# Patient Record
Sex: MALE | Race: WHITE | URBAN - METROPOLITAN AREA
[De-identification: names, ages, dates, MRNs, and addresses within clinical notes are randomized per-mention and may not be internally consistent; named-entity substitution may affect disease eponyms.]

---
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
## 2020-12-21 ENCOUNTER — HOSPITAL ENCOUNTER (OUTPATIENT)
Age: 20
Setting detail: SPECIMEN
Discharge: HOME | End: 2020-12-21
Payer: COMMERCIAL

## 2020-12-21 ENCOUNTER — OFFICE VISIT (OUTPATIENT)
Dept: FAMILY MEDICINE CLINIC | Age: 20
End: 2020-12-21
Payer: COMMERCIAL

## 2020-12-21 VITALS — OXYGEN SATURATION: 97 % | TEMPERATURE: 97.2 F | HEART RATE: 104 BPM

## 2020-12-21 PROCEDURE — 99202 OFFICE O/P NEW SF 15 MIN: CPT | Performed by: NURSE PRACTITIONER

## 2020-12-21 ASSESSMENT — ENCOUNTER SYMPTOMS
DIARRHEA: 0
NAUSEA: 0
SINUS PAIN: 0
ABDOMINAL PAIN: 0
BACK PAIN: 0
VOMITING: 0
SORE THROAT: 0
COUGH: 1
EYE PAIN: 0
SHORTNESS OF BREATH: 0

## 2020-12-21 NOTE — PROGRESS NOTES
[address] [facility]  [address]  [address] [facility] [address] [address]  Dept: [phone]  Dept Fax: [phone]    Collins Wang is a 21 y.o. male who presents to the urgent care today for his medicalconditions/complaints as noted below. Collins Wang is c/o of Covid Testing      HPI:         71-year-old male patient presents with complaint of multiple symptoms. Reports approximately 1 to 2 days he has had mild cough and loss of smell. Denies fevers or chills. Denies back fatigue. Denies nasal congestion. Denies chest pain or shortness of breath. Denies vomiting or diarrhea. Denies loss of taste smell. Treatments tried include [place] cold and flu. Reports positive COVID-19 exposure      Past Medical History:   Diagnosis Date    ADHD (attention deficit hyperactivity disorder)         Current Outpatient Medications   Medication Sig Dispense Refill    Dexmethylphenidate HCl ER 20 MG CP24 Take 1 capsule by mouth daily. 0     No current facility-administered medications for this visit. No Known Allergies    Subjective:      Review of Systems   Constitutional: Negative for chills and fatigue. HENT: Negative for congestion, ear pain, sinus pain and sore throat. Loss of smell   Eyes: Negative for pain and visual disturbance. Respiratory: Positive for cough. Negative for shortness of breath. Cardiovascular: Negative for chest pain and palpitations. Gastrointestinal: Negative for abdominal pain, diarrhea, nausea and vomiting. Genitourinary: Negative for penile pain and testicular pain. Musculoskeletal: Negative for back pain, joint swelling and neck pain. Skin: Negative for rash. Neurological: Negative for dizziness and light-headedness. Hematological: Does not bruise/bleed easily. All other systems reviewed and are negative. Objective:     Physical Exam  Vitals signs and nursing note reviewed.    Constitutional: General: He is not in acute distress. Appearance: Normal appearance. He is not toxic-appearing. HENT:      Right Ear: Tympanic membrane normal.      Left Ear: Tympanic membrane normal.      Nose: Nose normal.      Mouth/Throat:      Mouth: Mucous membranes are moist.      Pharynx: No posterior oropharyngeal erythema. Cardiovascular:      Rate and Rhythm: Normal rate. Pulmonary:      Effort: Pulmonary effort is normal. No respiratory distress. Breath sounds: Normal breath sounds. Skin:     General: Skin is warm and dry. Neurological:      General: No focal deficit present. Mental Status: He is alert and oriented to person, place, and time. Pulse 104   Temp 97.2 °F (36.2 °C)   SpO2 97%   Lab Review   No visits with results within 2 Month(s) from this visit. Latest known visit with results is:   No results found for any previous visit. Assessment:       Diagnosis Orders   1. Cough with exposure to COVID-19 virus  COVID-19 Ambulatory       Plan:      Return if symptoms worsen or fail to improve. No orders of the defined types were placed in this encounter. Recommend isolation pending covid results. Discussed treatment regimen to include rest, hydration, tylenol prn. Discussed deep breathing exercises. Discussed to monitor for progression of symptoms. Follow up as needed. Will contact patient for results  ER for acutely worsening symptoms. Patient given educational materials - see patient instructions. Discussed use, benefit, and side effects of prescribed medications. All patientquestions answered. Pt voiced understanding. This note was transcribed using dictation with Dragon services. Efforts were made to correct any errors but some words may be misinterpreted.      Electronically signed by PAT Jeffrey CNP on 12/21/2020at 3:28 PM

## 2020-12-21 NOTE — PATIENT INSTRUCTIONS
Advance Care Planning  People with COVID-19 may have no symptoms, mild symptoms, such as fever, cough, and shortness of breath or they may have more severe illness, developing severe and fatal pneumonia. As a result, Advance Care Planning with attention to naming a health care decision maker (someone you trust to make healthcare decisions for you if you could not speak for yourself) and sharing other health care preferences is important BEFORE a possible health crisis. Please contact your Primary Care Provider to discuss Advance Care Planning. Preventing the Spread of Coronavirus Disease 2019 in Homes and Residential Communities  For the most recent information go to SiConnect.fi    Prevention steps for People with confirmed or suspected COVID-19 (including persons under investigation) who do not need to be hospitalized  and   People with confirmed COVID-19 who were hospitalized and determined to be medically stable to go home    Your healthcare provider and public health staff will evaluate whether you can be cared for at home. If it is determined that you do not need to be hospitalized and can be isolated at home, you will be monitored by staff from your local or state health department. You should follow the prevention steps below until a healthcare provider or local or state health department says you can return to your normal activities. Stay home except to get medical care  People who are mildly ill with COVID-19 are able to isolate at home during their illness. You should restrict activities outside your home, except for getting medical care. Do not go to work, school, or public areas. Avoid using public transportation, ride-sharing, or taxis.   Separate yourself from other people and animals in your home People: As much as possible, you should stay in a specific room and away from other people in your home. Also, you should use a separate bathroom, if available. Animals: You should restrict contact with pets and other animals while you are sick with COVID-19, just like you would around other people. Although there have not been reports of pets or other animals becoming sick with COVID-19, it is still recommended that people sick with COVID-19 limit contact with animals until more information is known about the virus. When possible, have another member of your household care for your animals while you are sick. If you are sick with COVID-19, avoid contact with your pet, including petting, snuggling, being kissed or licked, and sharing food. If you must care for your pet or be around animals while you are sick, wash your hands before and after you interact with pets and wear a facemask. Call ahead before visiting your doctor  If you have a medical appointment, call the healthcare provider and tell them that you have or may have COVID-19. This will help the healthcare providers office take steps to keep other people from getting infected or exposed. Wear a facemask  You should wear a facemask when you are around other people (e.g., sharing a room or vehicle) or pets and before you enter a healthcare providers office. If you are not able to wear a facemask (for example, because it causes trouble breathing), then people who live with you should not stay in the same room with you, or they should wear a facemask if they enter your room. Cover your coughs and sneezes  Cover your mouth and nose with a tissue when you cough or sneeze. Throw used tissues in a lined trash can. Immediately wash your hands with soap and water for at least 20 seconds or, if soap and water are not available, clean your hands with an alcohol-based hand  that contains at least 60% alcohol.   Clean your hands often Seek prompt medical attention if your illness is worsening (e.g., difficulty breathing). Before seeking care, call your healthcare provider and tell them that you have, or are being evaluated for, COVID-19. Put on a facemask before you enter the facility. These steps will help the healthcare providers office to keep other people in the office or waiting room from getting infected or exposed. Ask your healthcare provider to call the local or state health department. Persons who are placed under active monitoring or facilitated self-monitoring should follow instructions provided by their local health department or occupational health professionals, as appropriate. When working with your local health department check their available hours. If you have a medical emergency and need to call 911, notify the dispatch personnel that you have, or are being evaluated for COVID-19. If possible, put on a facemask before emergency medical services arrive. Discontinuing home isolation  Patients with confirmed COVID-19 should remain under home isolation precautions until the risk of secondary transmission to others is thought to be low. The decision to discontinue home isolation precautions should be made on a case-by-case basis, in consultation with healthcare providers and state and local health departments.

## 2020-12-23 LAB — SARS-COV-2, NAA: NOT DETECTED

## 2021-07-13 ENCOUNTER — OFFICE VISIT (OUTPATIENT)
Dept: PRIMARY CARE CLINIC | Age: 21
End: 2021-07-13
Payer: COMMERCIAL

## 2021-07-13 ENCOUNTER — HOSPITAL ENCOUNTER (OUTPATIENT)
Age: 21
Setting detail: SPECIMEN
Discharge: HOME | End: 2021-07-13
Payer: COMMERCIAL

## 2021-07-13 VITALS
DIASTOLIC BLOOD PRESSURE: 67 MMHG | WEIGHT: 235 LBS | HEIGHT: 73 IN | HEART RATE: 54 BPM | BODY MASS INDEX: 31.14 KG/M2 | OXYGEN SATURATION: 98 % | SYSTOLIC BLOOD PRESSURE: 105 MMHG | TEMPERATURE: 97.6 F

## 2021-07-13 DIAGNOSIS — J01.90 ACUTE NON-RECURRENT SINUSITIS, UNSPECIFIED LOCATION: Primary | ICD-10-CM

## 2021-07-13 DIAGNOSIS — Z20.822 SUSPECTED COVID-19 VIRUS INFECTION: ICD-10-CM

## 2021-07-13 PROCEDURE — 99214 OFFICE O/P EST MOD 30 MIN: CPT | Performed by: FAMILY MEDICINE

## 2021-07-13 RX ORDER — AMOXICILLIN AND CLAVULANATE POTASSIUM 875; 125 MG/1; MG/1
1 TABLET, FILM COATED ORAL 2 TIMES DAILY
Qty: 14 TABLET | Refills: 0 | Status: SHIPPED | OUTPATIENT
Start: 2021-07-13 | End: 2021-07-20

## 2021-07-13 ASSESSMENT — PATIENT HEALTH QUESTIONNAIRE - PHQ9
2. FEELING DOWN, DEPRESSED OR HOPELESS: 0
SUM OF ALL RESPONSES TO PHQ QUESTIONS 1-9: 0
SUM OF ALL RESPONSES TO PHQ QUESTIONS 1-9: 0
SUM OF ALL RESPONSES TO PHQ9 QUESTIONS 1 & 2: 0
1. LITTLE INTEREST OR PLEASURE IN DOING THINGS: 0
SUM OF ALL RESPONSES TO PHQ QUESTIONS 1-9: 0

## 2021-07-13 ASSESSMENT — ENCOUNTER SYMPTOMS
SORE THROAT: 0
ABDOMINAL PAIN: 0
COUGH: 1
DIARRHEA: 0
SINUS COMPLAINT: 1
SINUS PRESSURE: 1
NAUSEA: 0
WHEEZING: 0
VOMITING: 0
SHORTNESS OF BREATH: 0
RHINORRHEA: 1

## 2021-07-13 NOTE — PATIENT INSTRUCTIONS
Patient Education        Sinusitis: Care Instructions  Your Care Instructions     Sinusitis is an infection of the lining of the sinus cavities in your head. Sinusitis often follows a cold. It causes pain and pressure in your head and face. In most cases, sinusitis gets better on its own in 1 to 2 weeks. But some mild symptoms may last for several weeks. Sometimes antibiotics are needed. Follow-up care is a key part of your treatment and safety. Be sure to make and go to all appointments, and call your doctor if you are having problems. It's also a good idea to know your test results and keep a list of the medicines you take. How can you care for yourself at home? · Take an over-the-counter pain medicine, such as acetaminophen (Tylenol), ibuprofen (Advil, Motrin), or naproxen (Aleve). Read and follow all instructions on the label. · If the doctor prescribed antibiotics, take them as directed. Do not stop taking them just because you feel better. You need to take the full course of antibiotics. · Be careful when taking over-the-counter cold or flu medicines and Tylenol at the same time. Many of these medicines have acetaminophen, which is Tylenol. Read the labels to make sure that you are not taking more than the recommended dose. Too much acetaminophen (Tylenol) can be harmful. · Breathe warm, moist air from a steamy shower, a hot bath, or a sink filled with hot water. Avoid cold, dry air. Using a humidifier in your home may help. Follow the directions for cleaning the machine. · Use saline (saltwater) nasal washes. This can help keep your nasal passages open and wash out mucus and bacteria. You can buy saline nose drops at a grocery store or drugstore. Or you can make your own at home by adding 1 teaspoon of salt and 1 teaspoon of baking soda to 2 cups of distilled water. If you make your own, fill a bulb syringe with the solution, insert the tip into your nostril, and squeeze gently.  Shayna amato nose.  · Put a hot, wet towel or a warm gel pack on your face 3 or 4 times a day for 5 to 10 minutes each time. · Try a decongestant nasal spray like oxymetazoline (Afrin). Do not use it for more than 3 days in a row. Using it for more than 3 days can make your congestion worse. When should you call for help? Call your doctor now or seek immediate medical care if:    · You have new or worse swelling or redness in your face or around your eyes.     · You have a new or higher fever. Watch closely for changes in your health, and be sure to contact your doctor if:    · You have new or worse facial pain.     · The mucus from your nose becomes thicker (like pus) or has new blood in it.     · You are not getting better as expected. Where can you learn more? Go to https://[employer].Epy.io. org and sign in to your Clikthrough account. Enter R154 in the DVDPlay box to learn more about \"Sinusitis: Care Instructions. \"     If you do not have an account, please click on the \"Sign Up Now\" link. Current as of: December 2, 2020               Content Version: 12.9  © 8201-9263 Healthwise, [facility]. Care instructions adapted under license by [facility] ([facility]). If you have questions about a medical condition or this instruction, always ask your healthcare professional. [address]  any warranty or liability for your use of this information.        Take antibiotic as prescribed for suspected sinus infection  If symptoms worsen or do not improve please follow-up with PCP or return to clinic

## 2021-07-13 NOTE — PROGRESS NOTES
[facility] [address] [facility] [place] [facility]  [address] [place] [address] [address] [address]  Dept: [phone]  Dept Fax: [phone]    Shantelle Mccoy is a 24 y.o. male who presents today for his medical conditions/complaintsas noted below. Shantelle Mccoy is c/o of Sinusitis (pt has been having head pressure and congestion with cough and some runny nose X 5 days )        HPI:     Sinus Problem  This is a new problem. The current episode started in the past 7 days (5 days). The problem is unchanged. There has been no fever. The pain is mild. Associated symptoms include congestion, coughing and sinus pressure. Pertinent negatives include no chills, ear pain, headaches, shortness of breath or sore throat. (Rhinorrhea) Treatments tried: tan ronak, OTC cold med. The treatment provided no relief. No known sick contacts  No significant past medical history  Past Medical History:   Diagnosis Date    ADHD (attention deficit hyperactivity disorder)     Past medical history reviewed and pertinent positives/negatives in the HPI    History reviewed. No pertinent surgical history. History reviewed. No pertinent family history. Social History     Tobacco Use    Smoking status: Never Smoker    Smokeless tobacco: Never Used   Substance Use Topics    Alcohol use: Not on file      Current Outpatient Medications   Medication Sig Dispense Refill    amoxicillin-clavulanate (AUGMENTIN) 875-125 MG per tablet Take 1 tablet by mouth 2 times daily for 7 days 14 tablet 0    Dexmethylphenidate HCl ER 20 MG CP24 Take 1 capsule by mouth daily. 0     No current facility-administered medications for this visit.      No Known Allergies    Health Maintenance   Topic Date Due    Hepatitis C screen  Never done    Varicella vaccine (1 of 2 - 2-dose childhood series) Never done    HPV vaccine (1 - Male 2-dose series) Never done    COVID-19 Vaccine (1) Never done    HIV screen  Never done    DTaP/Tdap/Td vaccine (1 - Cuff Size: Large Adult)   Pulse 54   Temp 97.6 °F (36.4 °C) (Tympanic)   Ht 6' 1\" (1.854 m)   Wt 235 lb (106.6 kg)   SpO2 98%   BMI 31.00 kg/m²     Assessment:       Diagnosis Orders   1. Acute non-recurrent sinusitis, unspecified location     2. Suspected COVID-19 virus infection  COVID-19       Plan:    Take antibiotic as prescribed for suspected sinus infection  If symptoms worsen or do not improve please follow-up with PCP or return to clinic    Orders Placed This Encounter   Procedures    COVID-19     Standing Status:   Future     Standing Expiration Date:   7/13/2022     Scheduling Instructions:      1) Due to current limited availability of the COVID-19 test, tests will be prioritized based on responses to questions above. Testing may be delayed due to volume. 2) Print and instruct patient to adhere to CDC home isolation program. (Link Above)              3) Set up or refer patient for a monitoring program.              4) Have patient sign up for and leverage MyChart (if not previously done). Order Specific Question:   Is this test for diagnosis or screening? Answer:   Diagnosis of ill patient     Order Specific Question:   Symptomatic for COVID-19 as defined by CDC? Answer:   Yes     Order Specific Question:   Date of Symptom Onset     Answer:   7/8/2021     Order Specific Question:   Hospitalized for COVID-19? Answer:   No     Order Specific Question:   Admitted to ICU for COVID-19? Answer:   No     Order Specific Question:   Employed in healthcare setting? Answer:   Unknown     Order Specific Question:   Resident in a congregate (group) care setting? Answer:   Unknown     Order Specific Question:   Pregnant: Answer:   No     Order Specific Question:   Previously tested for COVID-19?      Answer:   Yes     Orders Placed This Encounter   Medications    amoxicillin-clavulanate (AUGMENTIN) 875-125 MG per tablet     Sig: Take 1 tablet by mouth 2 times daily for 7 days     Dispense:  14 tablet     Refill:  0      Patient given educational materials - see patient instructions. Discussed use, benefit, and side effects of prescribed medications. All patient questions answered. Pt voiced understanding. Patient agreed with treatment plan. Follow up as directed.      Electronicallysigned by Luis Manuel Burk MD on 7/13/2021 at 10:37 AM

## 2021-07-14 LAB
SARS-COV-2: NORMAL
SARS-COV-2: NOT DETECTED
SOURCE: NORMAL

## 2022-03-18 ENCOUNTER — OFFICE VISIT (OUTPATIENT)
Dept: PRIMARY CARE CLINIC | Age: 22
End: 2022-03-18
Payer: COMMERCIAL

## 2022-03-18 VITALS
OXYGEN SATURATION: 98 % | TEMPERATURE: 97.5 F | HEART RATE: 59 BPM | SYSTOLIC BLOOD PRESSURE: 115 MMHG | DIASTOLIC BLOOD PRESSURE: 70 MMHG

## 2022-03-18 DIAGNOSIS — M54.50 ACUTE LEFT-SIDED LOW BACK PAIN WITHOUT SCIATICA: Primary | ICD-10-CM

## 2022-03-18 PROCEDURE — 99213 OFFICE O/P EST LOW 20 MIN: CPT | Performed by: FAMILY MEDICINE

## 2022-03-18 RX ORDER — PREDNISONE 20 MG/1
20 TABLET ORAL 2 TIMES DAILY
Qty: 10 TABLET | Refills: 0 | Status: SHIPPED | OUTPATIENT
Start: 2022-03-18 | End: 2022-03-23

## 2022-03-18 RX ORDER — CYCLOBENZAPRINE HCL 10 MG
10 TABLET ORAL 3 TIMES DAILY PRN
Qty: 15 TABLET | Refills: 0 | Status: SHIPPED | OUTPATIENT
Start: 2022-03-18 | End: 2022-03-28

## 2022-03-18 RX ORDER — ESCITALOPRAM OXALATE 20 MG/1
TABLET ORAL
COMMUNITY
Start: 2022-03-06

## 2022-03-18 ASSESSMENT — ENCOUNTER SYMPTOMS
BACK PAIN: 1
BOWEL INCONTINENCE: 0

## 2022-03-18 NOTE — PATIENT INSTRUCTIONS
Patient Education        Back Pain: Care Instructions  Your Care Instructions     Back pain has many possible causes. It is often related to problems with muscles and ligaments of the back. It may also be related to problems with the nerves, discs, or bones of the back. Moving, lifting, standing, sitting, or sleeping in an awkward way can strain the back. Sometimes you don't notice the injury until later. Arthritis is another common cause of back pain. Although it may hurt a lot, back pain usually improves on its own within several weeks. Most people recover in 12 weeks or less. Using good home treatment and being careful not to stress your back can help you feel better sooner. Follow-up care is a key part of your treatment and safety. Be sure to make and go to all appointments, and call your doctor if you are having problems. It's also a good idea to know your test results and keep a list of the medicines you take. How can you care for yourself at home? · Sit or lie in positions that are most comfortable and reduce your pain. Try one of these positions when you lie down:  ? Lie on your back with your knees bent and supported by large pillows. ? Lie on the floor with your legs on the seat of a sofa or chair. ? Lie on your side with your knees and hips bent and a pillow between your legs. ? Lie on your stomach if it does not make pain worse. · Do not sit up in bed, and avoid soft couches and twisted positions. Bed rest can help relieve pain at first, but it delays healing. Avoid bed rest after the first day of back pain. · Change positions every 30 minutes. If you must sit for long periods of time, take breaks from sitting. Get up and walk around, or lie in a comfortable position. · Try using a heating pad on a low or medium setting for 15 to 20 minutes every 2 or 3 hours. Try a warm shower in place of one session with the heating pad.   · You can also try an ice pack for 10 to 15 minutes every 2 to 3 hours. Put a thin cloth between the ice pack and your skin. · Take pain medicines exactly as directed. ? If the doctor gave you a prescription medicine for pain, take it as prescribed. ? If you are not taking a prescription pain medicine, ask your doctor if you can take an over-the-counter medicine. · Take short walks several times a day. You can start with 5 to 10 minutes, 3 or 4 times a day, and work up to longer walks. Walk on level surfaces and avoid hills and stairs until your back is better. · Return to work and other activities as soon as you can. Continued rest without activity is usually not good for your back. · To prevent future back pain, do exercises to stretch and strengthen your back and stomach. Learn how to use good posture, safe lifting techniques, and proper body mechanics. When should you call for help? Call your doctor now or seek immediate medical care if:    · You have new or worsening numbness in your legs.     · You have new or worsening weakness in your legs. (This could make it hard to stand up.)     · You lose control of your bladder or bowels. Watch closely for changes in your health, and be sure to contact your doctor if:    · You have a fever, lose weight, or don't feel well.     · You do not get better as expected. Where can you learn more? Go to https://LivelyFeed.PlayPhone. org and sign in to your [employer] account. Enter J240 in the [facility] box to learn more about \"Back Pain: Care Instructions. \"     If you do not have an account, please click on the \"Sign Up Now\" link. Current as of: July 1, 2021               Content Version: 13.1  © 1688-8341 Healthwise, [employer]orporated. Care instructions adapted under license by [facility] ([facility]). If you have questions about a medical condition or this instruction, always ask your healthcare professional. [address] any warranty or liability for your use of this information.        Take prednisone as prescribed and flexeril as needed for back pain. May take Tylenol/Motrin, ice/heat or try topical therapies such as lidocaine patches, IcyHot as needed for symptoms.   If symptoms worsen or do not improve please follow-up with PCP or return to clinic

## 2022-03-18 NOTE — PROGRESS NOTES
[address] W[facility] CARE  [address] [address]  Dept: [phone]  Dept Fax: [phone]    Ofelia Reece is a 24 y.o. male who presents today for his medical conditions/complaintsas noted below. Ofelia Reece is c/o of Lower Back Pain (started on Wednesday)        HPI:     Back Pain  This is a new problem. The current episode started in the past 7 days (2 days). The problem occurs constantly. The problem is unchanged. The pain is present in the lumbar spine. The pain does not radiate. The pain is at a severity of 8/10. The pain is severe. The symptoms are aggravated by standing and bending. Pertinent negatives include no bladder incontinence, bowel incontinence, fever, leg pain, numbness, tingling or weakness. Risk factors include obesity (Has been lifting and playing basketball recently). He has tried NSAIDs and analgesics (tylneol, icy hot, pain relief lotion) for the symptoms. The treatment provided no relief. Past Medical History:   Diagnosis Date    ADHD (attention deficit hyperactivity disorder)     Past medical history reviewed and pertinent positives/negatives in the HPI    History reviewed. No pertinent surgical history. History reviewed. No pertinent family history. Social History     Tobacco Use    Smoking status: Never Smoker    Smokeless tobacco: Never Used   Substance Use Topics    Alcohol use: Not on file      Current Outpatient Medications   Medication Sig Dispense Refill    escitalopram (LEXAPRO) 20 MG tablet TAKE 1 TABLET BY MOUTH EVERY DAY      cyclobenzaprine (FLEXERIL) 10 MG tablet Take 1 tablet by mouth 3 times daily as needed for Muscle spasms 15 tablet 0    predniSONE (DELTASONE) 20 MG tablet Take 1 tablet by mouth 2 times daily for 5 days 10 tablet 0    Dexmethylphenidate HCl ER 20 MG CP24 Take 1 capsule by mouth daily.  (Patient not taking: Reported on 3/18/2022)  0     No current facility-administered medications for this visit. No Known Allergies    Health Maintenance   Topic Date Due    Hepatitis C screen  Never done    COVID-19 Vaccine (1) Never done    HIV screen  Never done    Varicella vaccine (2 of 2 - 2-dose childhood series) 02/16/2016    HPV vaccine (3 - Male 3-dose series) 04/28/2022    Depression Screen  07/13/2022    DTaP/Tdap/Td vaccine (5 - Td or Tdap) 12/28/2031    Hepatitis A vaccine  Completed    Hib vaccine  Completed    Meningococcal (ACWY) vaccine  Completed    Flu vaccine  Completed    Hepatitis B vaccine  Aged Out    Pneumococcal 0-64 years Vaccine  Aged Out       :      Review of Systems   Constitutional: Negative for fever. Gastrointestinal: Negative for bowel incontinence. Genitourinary: Negative for bladder incontinence. Musculoskeletal: Positive for back pain. Neurological: Negative for tingling, weakness and numbness. Objective:     Physical Exam  Vitals and nursing note reviewed. Constitutional:       Appearance: Normal appearance. He is obese. HENT:      Head: Normocephalic and atraumatic. Right Ear: Hearing normal.      Left Ear: Hearing normal.      Mouth/Throat:      Lips: Pink. Eyes:      Extraocular Movements: Extraocular movements intact. Conjunctiva/sclera: Conjunctivae normal.   Cardiovascular:      Rate and Rhythm: Normal rate. Pulmonary:      Effort: Pulmonary effort is normal.   Musculoskeletal:      Cervical back: No muscular tenderness. Lumbar back: Spasms (left) present. No swelling, deformity or bony tenderness. Normal range of motion. Negative right straight leg raise test and negative left straight leg raise test.      Comments: Back pain exacerbated only with forward flexion of spine or flexion of the left leg at the hip   Skin:     General: Skin is warm and dry. Neurological:      Mental Status: He is alert and oriented to person, place, and time. Mental status is at baseline. Psychiatric:         Mood and Affect: Mood normal.         Behavior: Behavior normal.         Thought Content: Thought content normal.         Judgment: Judgment normal.       /70   Pulse 59   Temp 97.5 °F (36.4 °C) (Temporal)   SpO2 98%     Assessment:       Diagnosis Orders   1. Acute left-sided low back pain without sciatica         Plan:    Take prednisone as prescribed and flexeril as needed for back pain. May take Tylenol/Motrin, ice/heat or try topical therapies such as lidocaine patches, IcyHot as needed for symptoms. If symptoms worsen or do not improve please follow-up with PCP or return to clinic  Patient declined toradol injection at this time  No orders of the defined types were placed in this encounter. Orders Placed This Encounter   Medications    cyclobenzaprine (FLEXERIL) 10 MG tablet     Sig: Take 1 tablet by mouth 3 times daily as needed for Muscle spasms     Dispense:  15 tablet     Refill:  0    predniSONE (DELTASONE) 20 MG tablet     Sig: Take 1 tablet by mouth 2 times daily for 5 days     Dispense:  10 tablet     Refill:  0      Patient given educational materials - see patient instructions. Discussed use, benefit, and side effects of prescribed medications. All patient questions answered. Pt voiced understanding. Patient agreed with treatment plan. Follow up as directed.      Electronicallysigned by Yunier Barrios MD on 3/18/2022 at 10:43 AM

## 2022-07-17 ENCOUNTER — OFFICE VISIT (OUTPATIENT)
Dept: PRIMARY CARE CLINIC | Age: 22
End: 2022-07-17
Payer: COMMERCIAL

## 2022-07-17 VITALS
HEIGHT: 73 IN | BODY MASS INDEX: 29.16 KG/M2 | TEMPERATURE: 98.1 F | SYSTOLIC BLOOD PRESSURE: 116 MMHG | DIASTOLIC BLOOD PRESSURE: 71 MMHG | WEIGHT: 220 LBS | HEART RATE: 66 BPM | OXYGEN SATURATION: 96 %
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
## 2022-07-17 PROCEDURE — 99213 OFFICE O/P EST LOW 20 MIN: CPT | Performed by: FAMILY MEDICINE

## 2022-07-17 RX ORDER — AMOXICILLIN AND CLAVULANATE POTASSIUM 875; 125 MG/1; MG/1
1 TABLET, FILM COATED ORAL 2 TIMES DAILY
Qty: 14 TABLET | Refills: 0 | Status: SHIPPED | OUTPATIENT
Start: 2022-07-17 | End: 2022-07-24

## 2022-07-17 SDOH — ECONOMIC STABILITY: FOOD INSECURITY: WITHIN THE PAST 12 MONTHS, YOU WORRIED THAT YOUR FOOD WOULD RUN OUT BEFORE YOU GOT MONEY TO BUY MORE.: NEVER TRUE

## 2022-07-17 SDOH — ECONOMIC STABILITY: FOOD INSECURITY: WITHIN THE PAST 12 MONTHS, THE FOOD YOU BOUGHT JUST DIDN'T LAST AND YOU DIDN'T HAVE MONEY TO GET MORE.: NEVER TRUE

## 2022-07-17 ASSESSMENT — PATIENT HEALTH QUESTIONNAIRE - PHQ9
2. FEELING DOWN, DEPRESSED OR HOPELESS: 0
1. LITTLE INTEREST OR PLEASURE IN DOING THINGS: 0
SUM OF ALL RESPONSES TO PHQ QUESTIONS 1-9: 0
SUM OF ALL RESPONSES TO PHQ9 QUESTIONS 1 & 2: 0
SUM OF ALL RESPONSES TO PHQ QUESTIONS 1-9: 0

## 2022-07-17 ASSESSMENT — SOCIAL DETERMINANTS OF HEALTH (SDOH): HOW HARD IS IT FOR YOU TO PAY FOR THE VERY BASICS LIKE FOOD, HOUSING, MEDICAL CARE, AND HEATING?: NOT HARD AT ALL

## 2022-07-17 NOTE — PATIENT INSTRUCTIONS
Keep the bite wound clean and dry. May apply triple antibiotic ointment as needed. Take antibiotic as prescribed to help prevent infection.   If symptoms worsen or do not improve please follow-up with PCP or return to clinic

## 2022-07-17 NOTE — PROGRESS NOTES
[address] [facility]  [address] [address]  Dept: [phone]  Dept Fax: [phone]    Romie Crump is a 25 y.o. male who presents today for his medical conditions/complaintsas noted below. Romie Crump is c/o of Animal Bite (Dog bite on tip of nose today)        HPI:     Animal Bite   The incident occurred just prior to arrival. The incident occurred at home. There is an injury to the Nose. The pain is mild. It is unlikely that a foreign body is present. There have been no prior injuries to these areas. His tetanus status is UTD. He has been Behaving normally. There were no sick contacts. Past Medical History:   Diagnosis Date    ADHD (attention deficit hyperactivity disorder)     Past medical history reviewed and pertinent positives/negatives in the HPI    History reviewed. No pertinent surgical history. History reviewed. No pertinent family history. Social History     Tobacco Use    Smoking status: Never    Smokeless tobacco: Never   Substance Use Topics    Alcohol use: Not on file      Current Outpatient Medications   Medication Sig Dispense Refill    amoxicillin-clavulanate (AUGMENTIN) 875-125 MG per tablet Take 1 tablet by mouth in the morning and 1 tablet before bedtime. Do all this for 7 days. 14 tablet 0    escitalopram (LEXAPRO) 20 MG tablet TAKE 1 TABLET BY MOUTH EVERY DAY      Dexmethylphenidate HCl ER 20 MG CP24 Take 1 capsule by mouth daily. (Patient not taking: No sig reported)  0     No current facility-administered medications for this visit.      No Known Allergies    Health Maintenance   Topic Date Due    COVID-19 Vaccine (1) Never done    HIV screen  Never done    Varicella vaccine (2 of 2 - 2-dose childhood series) 02/16/2016    Hepatitis C screen  Never done    HPV vaccine (3 - Male 3-dose series) 04/28/2022    Depression Screen  07/13/2022    Flu vaccine (1) 09/01/2022 DTaP/Tdap/Td vaccine (5 - Td or Tdap) 12/28/2031    Hepatitis A vaccine  Completed    Hib vaccine  Completed    Meningococcal (ACWY) vaccine  Completed    Hepatitis B vaccine  Aged Out    Pneumococcal 0-64 years Vaccine  Aged Out       :      Review of Systems    Objective:     Physical Exam  Vitals and nursing note reviewed. Constitutional:       Appearance: Normal appearance. HENT:      Head: Normocephalic and atraumatic. Right Ear: Hearing normal.      Left Ear: Hearing normal.      Mouth/Throat:      Lips: Pink. Eyes:      Extraocular Movements: Extraocular movements intact. Conjunctiva/sclera: Conjunctivae normal.   Cardiovascular:      Rate and Rhythm: Normal rate. Pulmonary:      Effort: Pulmonary effort is normal.   Musculoskeletal:      Cervical back: No muscular tenderness. Skin:     General: Skin is warm and dry. Findings: Laceration (approx 7mm superficial laceration to inside tip of right nare) present. Neurological:      Mental Status: He is alert and oriented to person, place, and time. Mental status is at baseline. Psychiatric:         Mood and Affect: Mood normal.         Behavior: Behavior normal.         Thought Content: Thought content normal.         Judgment: Judgment normal.     /71   Pulse 66   Temp 98.1 °F (36.7 °C)   Ht 6' 1\" (1.854 m)   Wt 220 lb (99.8 kg)   SpO2 96%   BMI 29.03 kg/m²     Assessment:       Diagnosis Orders   1. Dog bite, initial encounter            Plan: The bite wound is not actively bleeding and skin edges appear to be aligned. Therefore there is no repair needed at this time. Keep the bite wound clean and dry. May apply triple antibiotic ointment as needed. Take antibiotic as prescribed to help prevent infection. If symptoms worsen or do not improve please follow-up with PCP or return to clinic    No orders of the defined types were placed in this encounter.     Orders Placed This Encounter   Medications amoxicillin-clavulanate (AUGMENTIN) 875-125 MG per tablet     Sig: Take 1 tablet by mouth in the morning and 1 tablet before bedtime. Do all this for 7 days. Dispense:  14 tablet     Refill:  0      Patient given educational materials - see patient instructions. Discussed use, benefit, and side effects of prescribed medications. All patient questions answered. Pt voiced understanding. Patient agreed with treatment plan. Follow up as directed.      Electronicallysigned by Danielle Amor MD on 7/17/2022 at 1:49 PM

## 2023-01-13 ENCOUNTER — OFFICE VISIT (OUTPATIENT)
Dept: PRIMARY CARE CLINIC | Age: 23
End: 2023-01-13
Payer: COMMERCIAL

## 2023-01-13 VITALS
TEMPERATURE: 98.6 F | SYSTOLIC BLOOD PRESSURE: 124 MMHG | HEART RATE: 65 BPM | OXYGEN SATURATION: 99 % | DIASTOLIC BLOOD PRESSURE: 70 MMHG

## 2023-01-13 DIAGNOSIS — R42 VERTIGO: Primary | ICD-10-CM

## 2023-01-13 PROCEDURE — G8484 FLU IMMUNIZE NO ADMIN: HCPCS

## 2023-01-13 PROCEDURE — 99213 OFFICE O/P EST LOW 20 MIN: CPT

## 2023-01-13 PROCEDURE — 1036F TOBACCO NON-USER: CPT

## 2023-01-13 PROCEDURE — G8427 DOCREV CUR MEDS BY ELIG CLIN: HCPCS

## 2023-01-13 PROCEDURE — G8417 CALC BMI ABV UP PARAM F/U: HCPCS

## 2023-01-13 RX ORDER — MECLIZINE HYDROCHLORIDE 25 MG/1
25 TABLET ORAL 3 TIMES DAILY PRN
Qty: 30 TABLET | Refills: 0 | Status: SHIPPED | OUTPATIENT
Start: 2023-01-13 | End: 2023-01-23

## 2023-01-13 ASSESSMENT — ENCOUNTER SYMPTOMS
VISUAL CHANGE: 0
SHORTNESS OF BREATH: 0
CONSTIPATION: 0
RECTAL PAIN: 0
TROUBLE SWALLOWING: 0
RESPIRATORY NEGATIVE: 1
ABDOMINAL DISTENTION: 0
CHEST TIGHTNESS: 0
STRIDOR: 0
PHOTOPHOBIA: 0
RHINORRHEA: 0
COLOR CHANGE: 0
SINUS PRESSURE: 0
SWOLLEN GLANDS: 0
ANAL BLEEDING: 0
SORE THROAT: 0
BLOOD IN STOOL: 0
ABDOMINAL PAIN: 0
VOICE CHANGE: 0
EYE REDNESS: 0
WHEEZING: 0
NAUSEA: 0
GASTROINTESTINAL NEGATIVE: 1
SINUS PAIN: 0
EYE PAIN: 0
APNEA: 0
VOMITING: 0
BACK PAIN: 0
CHOKING: 0
DIARRHEA: 0
CHANGE IN BOWEL HABIT: 0
FACIAL SWELLING: 0
EYE ITCHING: 0
COUGH: 0
EYES NEGATIVE: 1
EYE DISCHARGE: 0

## 2023-01-13 ASSESSMENT — PATIENT HEALTH QUESTIONNAIRE - PHQ9
SUM OF ALL RESPONSES TO PHQ QUESTIONS 1-9: 1
SUM OF ALL RESPONSES TO PHQ QUESTIONS 1-9: 1
SUM OF ALL RESPONSES TO PHQ9 QUESTIONS 1 & 2: 1
SUM OF ALL RESPONSES TO PHQ QUESTIONS 1-9: 1
SUM OF ALL RESPONSES TO PHQ QUESTIONS 1-9: 1
1. LITTLE INTEREST OR PLEASURE IN DOING THINGS: 0
2. FEELING DOWN, DEPRESSED OR HOPELESS: 1

## 2023-01-13 NOTE — PROGRESS NOTES
[address] [place] [facility] [id number] [place] [address] WALK IN [place] [address]  Dept: [phone]    Rodney Kline is a 25 y.o. male Established patient, who presents to the walk-in clinic today with conditions/complaints as noted below:    Chief Complaint   Patient presents with    Dizziness     And ha - started yesterday late morning         HPI:     Dizziness  This is a new problem. The current episode started yesterday. The problem occurs constantly. The problem has been unchanged. Associated symptoms include headaches. Pertinent negatives include no abdominal pain, anorexia, arthralgias, change in bowel habit, chest pain, chills, congestion, coughing, diaphoresis, fatigue, fever, joint swelling, myalgias, nausea, neck pain, numbness, rash, sore throat, swollen glands, urinary symptoms, vertigo, visual change, vomiting or weakness. Treatments tried: rest. Improvement on treatment: felt better after resting and then returned. Pt reports he feels well hydrated. He started to feel off balance yesterday and double vision but it has returned. He has tried resting with no improvement. Denies nausea, chest pain, SOB, numbness or tingling. Past Medical History:   Diagnosis Date    ADHD (attention deficit hyperactivity disorder)        Current Outpatient Medications   Medication Sig Dispense Refill    meclizine (ANTIVERT) 25 MG tablet Take 1 tablet by mouth 3 times daily as needed for Dizziness 30 tablet 0    escitalopram (LEXAPRO) 20 MG tablet TAKE 1 TABLET BY MOUTH EVERY DAY      Dexmethylphenidate HCl ER 20 MG CP24 Take 1 capsule by mouth daily. (Patient not taking: No sig reported)  0     No current facility-administered medications for this visit.        No Known Allergies    Review of Systems:     Review of Systems Constitutional: Negative. Negative for activity change, appetite change, chills, diaphoresis, fatigue, fever and unexpected weight change. HENT: Negative. Negative for congestion, dental problem, drooling, ear discharge, ear pain, facial swelling, hearing loss, mouth sores, nosebleeds, postnasal drip, rhinorrhea, sinus pressure, sinus pain, sneezing, sore throat, tinnitus, trouble swallowing and voice change. Eyes: Negative. Negative for photophobia, pain, discharge, redness, itching and visual disturbance. Respiratory: Negative. Negative for apnea, cough, choking, chest tightness, shortness of breath, wheezing and stridor. Cardiovascular: Negative. Negative for chest pain, palpitations and leg swelling. Gastrointestinal: Negative. Negative for abdominal distention, abdominal pain, anal bleeding, anorexia, blood in stool, change in bowel habit, constipation, diarrhea, nausea, rectal pain and vomiting. Musculoskeletal: Negative. Negative for arthralgias, back pain, gait problem, joint swelling, myalgias, neck pain and neck stiffness. Skin: Negative. Negative for color change, pallor, rash and wound. Neurological:  Positive for dizziness, light-headedness and headaches. Negative for vertigo, tremors, seizures, syncope, facial asymmetry, speech difficulty, weakness and numbness. Physical Exam:      /70   Pulse 65   Temp 98.6 °F (37 °C) (Tympanic)   SpO2 99%     Physical Exam  Vitals reviewed. Constitutional:       Appearance: Normal appearance. HENT:      Head: Normocephalic and atraumatic. Right Ear: Tympanic membrane, ear canal and external ear normal.      Left Ear: Tympanic membrane, ear canal and external ear normal.      Nose: Nose normal.      Mouth/Throat:      Lips: Pink. Mouth: Mucous membranes are moist.      Pharynx: Oropharynx is clear. Uvula midline. Eyes:      Extraocular Movements: Extraocular movements intact.       Conjunctiva/sclera: Conjunctivae normal.      Pupils: Pupils are equal, round, and reactive to light. Cardiovascular:      Rate and Rhythm: Normal rate and regular rhythm. Pulses: Normal pulses. Heart sounds: Normal heart sounds. Pulmonary:      Effort: Pulmonary effort is normal.      Breath sounds: Normal breath sounds and air entry. Abdominal:      General: Abdomen is flat. Bowel sounds are normal.      Palpations: Abdomen is soft. Musculoskeletal:         General: Normal range of motion. Cervical back: Normal range of motion and neck supple. Skin:     General: Skin is warm and dry. Capillary Refill: Capillary refill takes less than 2 seconds. Neurological:      General: No focal deficit present. Mental Status: He is alert and oriented to person, place, and time. Mental status is at baseline. Cranial Nerves: Cranial nerves 2-12 are intact. Sensory: Sensation is intact. Motor: Motor function is intact. Coordination: Coordination is intact. Gait: Gait is intact. Psychiatric:         Attention and Perception: Attention normal.         Mood and Affect: Mood and affect normal.         Behavior: Behavior normal. Behavior is cooperative. Cognition and Memory: Cognition normal.       Plan:          1. Vertigo  -     meclizine (ANTIVERT) 25 MG tablet; Take 1 tablet by mouth 3 times daily as needed for Dizziness, Disp-30 tablet, R-0Normal     -Red flag signs such as worst headache he has ever had, chest pain, SOB, lightheadedness to go to ER.  -Increase fluid intake.  -Epley maneuver exercises attached in AVS.  -He verbalized understanding and agreeable to plan. Follow Up Instructions:      Return for SOB, chest pain go to ER.     Orders Placed This Encounter   Medications    meclizine (ANTIVERT) 25 MG tablet     Sig: Take 1 tablet by mouth 3 times daily as needed for Dizziness     Dispense:  30 tablet     Refill:  0           Based on the physical exam findings-- I believe the symptoms are related to Vertigo. Meclizine as needed for the dizziness/nausea. Educational materials provided on AVS.  Follow up if symptoms do not improve/worsen. Patient and/or parent given educational materials - see patient instructions. Discussed use, benefit, and side effects of prescribed medications. All patient questions answered. Patient and/or parent voiced understanding.       Electronically signed by PAT Chandler 1/13/2023 at 6:37 PM

## 2023-02-05 SDOH — HEALTH STABILITY: PHYSICAL HEALTH: ON AVERAGE, HOW MANY MINUTES DO YOU ENGAGE IN EXERCISE AT THIS LEVEL?: 50 MIN

## 2023-02-05 SDOH — HEALTH STABILITY: PHYSICAL HEALTH: ON AVERAGE, HOW MANY DAYS PER WEEK DO YOU ENGAGE IN MODERATE TO STRENUOUS EXERCISE (LIKE A BRISK WALK)?: 5 DAYS

## 2023-02-06 ENCOUNTER — OFFICE VISIT (OUTPATIENT)
Dept: FAMILY MEDICINE CLINIC | Age: 23
End: 2023-02-06
Payer: COMMERCIAL

## 2023-02-06 VITALS
HEART RATE: 65 BPM | TEMPERATURE: 98.2 F | OXYGEN SATURATION: 98 % | SYSTOLIC BLOOD PRESSURE: 128 MMHG | HEIGHT: 72 IN | DIASTOLIC BLOOD PRESSURE: 70 MMHG | BODY MASS INDEX: 36.44 KG/M2 | WEIGHT: 269 LBS

## 2023-02-06 DIAGNOSIS — R63.5 WEIGHT GAIN: ICD-10-CM

## 2023-02-06 DIAGNOSIS — Z00.00 WELL ADULT EXAM: Primary | ICD-10-CM

## 2023-02-06 PROCEDURE — G8484 FLU IMMUNIZE NO ADMIN: HCPCS | Performed by: PHYSICIAN ASSISTANT

## 2023-02-06 PROCEDURE — 99385 PREV VISIT NEW AGE 18-39: CPT | Performed by: PHYSICIAN ASSISTANT

## 2023-02-06 SDOH — ECONOMIC STABILITY: INCOME INSECURITY: HOW HARD IS IT FOR YOU TO PAY FOR THE VERY BASICS LIKE FOOD, HOUSING, MEDICAL CARE, AND HEATING?: NOT HARD AT ALL

## 2023-02-06 SDOH — ECONOMIC STABILITY: HOUSING INSECURITY
IN THE LAST 12 MONTHS, WAS THERE A TIME WHEN YOU DID NOT HAVE A STEADY PLACE TO SLEEP OR SLEPT IN A SHELTER (INCLUDING NOW)?: NO

## 2023-02-06 SDOH — ECONOMIC STABILITY: FOOD INSECURITY: WITHIN THE PAST 12 MONTHS, THE FOOD YOU BOUGHT JUST DIDN'T LAST AND YOU DIDN'T HAVE MONEY TO GET MORE.: NEVER TRUE

## 2023-02-06 SDOH — ECONOMIC STABILITY: FOOD INSECURITY: WITHIN THE PAST 12 MONTHS, YOU WORRIED THAT YOUR FOOD WOULD RUN OUT BEFORE YOU GOT MONEY TO BUY MORE.: NEVER TRUE

## 2023-02-06 ASSESSMENT — ENCOUNTER SYMPTOMS
EYE DISCHARGE: 0
SINUS PRESSURE: 0
COLOR CHANGE: 0
SORE THROAT: 0
RHINORRHEA: 0
DIARRHEA: 0
BACK PAIN: 0
ABDOMINAL PAIN: 0
NAUSEA: 0
EYE REDNESS: 0
VOICE CHANGE: 0
TROUBLE SWALLOWING: 0
SINUS PAIN: 0
COUGH: 0
CHEST TIGHTNESS: 0
BLOOD IN STOOL: 0
VOMITING: 0
CONSTIPATION: 0
WHEEZING: 0
SHORTNESS OF BREATH: 0

## 2023-02-06 ASSESSMENT — PATIENT HEALTH QUESTIONNAIRE - PHQ9
SUM OF ALL RESPONSES TO PHQ QUESTIONS 1-9: 0
1. LITTLE INTEREST OR PLEASURE IN DOING THINGS: 0
SUM OF ALL RESPONSES TO PHQ QUESTIONS 1-9: 0
SUM OF ALL RESPONSES TO PHQ QUESTIONS 1-9: 0
SUM OF ALL RESPONSES TO PHQ9 QUESTIONS 1 & 2: 0
SUM OF ALL RESPONSES TO PHQ QUESTIONS 1-9: 0
2. FEELING DOWN, DEPRESSED OR HOPELESS: 0

## 2023-02-06 NOTE — PROGRESS NOTES
[facility]  [address] [address]  Dept: [phone]  Dept Fax: [phone]    Dedrick Eisenmenger is a 25 y.o. male who presents today for his medical conditions/complaintsas noted below. Dedrick Eisenmenger is c/o of   Chief Complaint   Patient presents with    Annual Exam         HPI:     HPI    Patient is new to the office today. He has graduated from [employer] with communications degree and is now working for [employer]. Patient states he is up to date on vaccines. He tries to stay active. He goes to the gym 5 days a week. He typically plays basketball a few hours, does weight training in variety. Bikes 20 min a week. He does admit to snacking more at work and often sits at work. He has been trying to eat less sugar overall. He would like to see more weight loss. States he weighed around 210 last year and would like a goal of around 230 now    No results found for: LABA1C          ( goal A1Cis < 7)   No results found for: LABMICR  No results found for: LDLCHOLESTEROL, LDLCALC    (goal LDL is <100)   No results found for: AST, ALT, BUN, CR  BP Readings from Last 3 Encounters:   02/06/23 128/70   01/13/23 124/70   07/17/22 116/71          (goal 120/80)    Past Medical History:   Diagnosis Date    ADHD (attention deficit hyperactivity disorder)     Depression February 2021      Past Surgical History:   Procedure Laterality Date    WISDOM TOOTH EXTRACTION         Family History   Problem Relation Age of Onset    No Known Problems Mother     No Known Problems Father     Stroke Maternal Grandmother     Stroke Maternal Grandfather     Heart Disease Paternal Grandmother         irregular heartbeat    Cancer Paternal Grandfather         lung, smoker       Social History     Tobacco Use    Smoking status: Never    Smokeless tobacco: Never   Substance Use Topics    Alcohol use:  Yes     Alcohol/week: 5.0 standard drinks     Types: 3 Shots of liquor, 2 Drinks containing 0.5 oz of alcohol per week     Comment: Very occasionally      Current Outpatient Medications   Medication Sig Dispense Refill    escitalopram (LEXAPRO) 20 MG tablet TAKE 1 TABLET BY MOUTH EVERY DAY       No current facility-administered medications for this visit. No Known Allergies    Health Maintenance   Topic Date Due    COVID-19 Vaccine (1) Never done    HIV screen  Never done    Varicella vaccine (2 of 2 - 2-dose childhood series) 02/16/2016    Hepatitis C screen  Never done    HPV vaccine (3 - Male 3-dose series) 04/28/2022    Flu vaccine (1) 08/01/2022    Depression Screen  01/13/2024    DTaP/Tdap/Td vaccine (5 - Td or Tdap) 12/28/2031    Hepatitis A vaccine  Completed    Hib vaccine  Completed    Meningococcal (ACWY) vaccine  Completed    Pneumococcal 0-64 years Vaccine  Aged Out       Subjective:     Review of Systems   Constitutional: Negative. Negative for activity change, appetite change, fatigue, fever and unexpected weight change. HENT:  Negative for congestion, ear pain, postnasal drip, rhinorrhea, sinus pressure, sinus pain, sneezing, sore throat, trouble swallowing and voice change. Eyes:  Negative for discharge and redness. Respiratory:  Negative for cough, chest tightness, shortness of breath and wheezing. Cardiovascular:  Negative for chest pain, palpitations and leg swelling. Gastrointestinal:  Negative for abdominal pain, blood in stool, constipation, diarrhea, nausea and vomiting. Endocrine: Negative for cold intolerance and heat intolerance. Genitourinary:  Negative for dysuria, flank pain, frequency and urgency. Musculoskeletal:  Negative for arthralgias, back pain, gait problem, joint swelling, myalgias, neck pain and neck stiffness. Skin:  Negative for color change, pallor, rash and wound. Allergic/Immunologic: Negative for environmental allergies and food allergies. Neurological:  Negative for weakness, light-headedness and headaches. Hematological:  Negative for adenopathy. Does not bruise/bleed easily. Psychiatric/Behavioral:  Negative for agitation, behavioral problems, confusion and sleep disturbance. The patient is not nervous/anxious. Objective:     Physical Exam  Vitals and nursing note reviewed. Constitutional:       General: He is not in acute distress. Appearance: Normal appearance. He is well-developed. He is not ill-appearing. HENT:      Head: Normocephalic and atraumatic. Right Ear: Tympanic membrane, ear canal and external ear normal. There is no impacted cerumen. Tympanic membrane is not perforated, erythematous, retracted or bulging. Left Ear: Tympanic membrane, ear canal and external ear normal. There is no impacted cerumen. Tympanic membrane is not perforated, erythematous, retracted or bulging. Nose: Nose normal. No congestion or rhinorrhea. Mouth/Throat:      Mouth: Mucous membranes are moist.      Pharynx: No oropharyngeal exudate or posterior oropharyngeal erythema. Eyes:      Pupils: Pupils are equal, round, and reactive to light. Neck:      Thyroid: No thyromegaly. Cardiovascular:      Rate and Rhythm: Normal rate and regular rhythm. Heart sounds: Normal heart sounds. No murmur heard. Pulmonary:      Effort: Pulmonary effort is normal. No respiratory distress. Breath sounds: Normal breath sounds. No wheezing, rhonchi or rales. Abdominal:      General: Bowel sounds are normal. There is no distension. Palpations: Abdomen is soft. There is no mass. Tenderness: There is no abdominal tenderness. There is no right CVA tenderness, left CVA tenderness, guarding or rebound. Musculoskeletal:         General: Normal range of motion. Cervical back: Normal range of motion and neck supple. Right lower leg: No edema. Left lower leg: No edema. Lymphadenopathy:      Cervical: No cervical adenopathy. Skin:     General: Skin is warm and dry.       Findings: No lesion or rash. Neurological:      Mental Status: He is alert and oriented to person, place, and time. Motor: No weakness. Gait: Gait normal.   Psychiatric:         Mood and Affect: Mood normal.         Behavior: Behavior normal.         Thought Content: Thought content normal.         Judgment: Judgment normal.     /70 (Site: Left Upper Arm, Position: Sitting, Cuff Size: Large Adult)   Pulse 65   Temp 98.2 °F (36.8 °C) (Tympanic)   Ht 6' (1.829 m)   Wt 269 lb (122 kg)   SpO2 98%   BMI 36.48 kg/m²     Assessment:       Diagnosis Orders   1. Well adult exam  Comprehensive Metabolic Panel    Lipid Panel    CBC with Auto Differential    TSH with Reflex      2. Weight gain  TSH with Reflex                Plan:      Return in about 3 months (around 5/6/2023) for weight recheck. Recommend healthy diet-low carb/calorie diet, healthy whole foods. Regular exercise encouraged. Recommendation for 150min of exercise a week. Can be divided however convenient. Recommend healthy sleep habit. Try and go to bed at the same time and wake up at the same time for the most restfull pattern. Also recommend regular healthy fluid intake. Water is the best at hydrating us. Encourage minimal caffeine and pop/soda use  We discussed in fasting, regular cardiac exercise and more frequent. I recommended he aim for 150 minutes of cardiac exercise weekly. We also discussed low refined sugar diet. He is interested in doing blood work at this time along with a weight recheck in 3 months.   Await lab results and will make further adjustments if needed  Patient agreed with treatment plan      Orders Placed This Encounter   Procedures    Comprehensive Metabolic Panel     Standing Status:   Future     Standing Expiration Date:   2/6/2024    Lipid Panel     Standing Status:   Future     Standing Expiration Date:   2/6/2024     Order Specific Question:   Is Patient Fasting?/# of Hours     Answer:   yes    CBC with Auto Differential     Standing Status:   Future     Standing Expiration Date:   2/6/2024    TSH with Reflex     Standing Status:   Future     Standing Expiration Date:   2/6/2024         Patient given educational materials - see patient instructions. Discussed use, benefit, and side effects of prescribed medications. All patientquestions answered. Pt voiced understanding. Reviewed health maintenance. Instructedto continue current medications, diet and exercise. Patient agreed with treatmentplan. Follow up as directed. Please note that this chart was generated using voice recognition Dragon dictation software. Although every effort was made to ensure the accuracy of this automated transcription, some errors in transcription may have occurred.      Electronically signed by Elen Calloway PA-C on 2/6/2023 at 12:11 PM

## 2023-02-06 NOTE — PROGRESS NOTES
Visit Information    Have you changed or started any medications since your last visit including any over-the-counter medicines, vitamins, or herbal medicines? no   Are you having any side effects from any of your medications? -  no  Have you stopped taking any of your medications? Is so, why? -  no    Have you seen any other physician or provider since your last visit? No  Have you had any other diagnostic tests since your last visit? No  Have you been seen in the emergency room and/or had an admission to a hospital since we last saw you? No  Have you had your routine dental cleaning in the past 6 months? no    Have you activated your [employer] account? If not, what are your barriers?  Yes     Patient Care Team:  Masha Coelho PA-C as PCP - General (Physician Assistant)    Medical History Review  Past Medical, Family, and Social History reviewed and  contribute to the patient presenting condition    Health Maintenance   Topic Date Due    COVID-19 Vaccine (1) Never done    HIV screen  Never done    Varicella vaccine (2 of 2 - 2-dose childhood series) 02/16/2016    Hepatitis C screen  Never done    HPV vaccine (3 - Male 3-dose series) 04/28/2022    Flu vaccine (1) 08/01/2022    Depression Screen  01/13/2024    DTaP/Tdap/Td vaccine (5 - Td or Tdap) 12/28/2031    Hepatitis A vaccine  Completed    Hib vaccine  Completed    Meningococcal (ACWY) vaccine  Completed    Pneumococcal 0-64 years Vaccine  Aged Out

## 2023-03-09 RX ORDER — ESCITALOPRAM OXALATE 20 MG/1
TABLET ORAL
Qty: 30 TABLET | Refills: 3 | Status: SHIPPED | OUTPATIENT
Start: 2023-03-09

## 2023-05-15 ENCOUNTER — OFFICE VISIT (OUTPATIENT)
Dept: FAMILY MEDICINE CLINIC | Age: 23
End: 2023-05-15
Payer: COMMERCIAL

## 2023-05-15 VITALS
TEMPERATURE: 97.8 F | WEIGHT: 276 LBS | HEIGHT: 72 IN | OXYGEN SATURATION: 98 % | SYSTOLIC BLOOD PRESSURE: 134 MMHG | DIASTOLIC BLOOD PRESSURE: 70 MMHG | BODY MASS INDEX: 37.38 KG/M2 | HEART RATE: 65 BPM

## 2023-05-15 DIAGNOSIS — F41.9 ANXIETY: ICD-10-CM

## 2023-05-15 DIAGNOSIS — R63.5 WEIGHT GAIN: Primary | ICD-10-CM

## 2023-05-15 PROCEDURE — 1036F TOBACCO NON-USER: CPT | Performed by: PHYSICIAN ASSISTANT

## 2023-05-15 PROCEDURE — G8417 CALC BMI ABV UP PARAM F/U: HCPCS | Performed by: PHYSICIAN ASSISTANT

## 2023-05-15 PROCEDURE — 99213 OFFICE O/P EST LOW 20 MIN: CPT | Performed by: PHYSICIAN ASSISTANT

## 2023-05-15 PROCEDURE — G8427 DOCREV CUR MEDS BY ELIG CLIN: HCPCS | Performed by: PHYSICIAN ASSISTANT

## 2023-05-15 RX ORDER — ESCITALOPRAM OXALATE 20 MG/1
TABLET ORAL
Qty: 90 TABLET | Refills: 1 | Status: SHIPPED | OUTPATIENT
Start: 2023-05-15

## 2023-05-15 ASSESSMENT — ENCOUNTER SYMPTOMS: COLOR CHANGE: 0

## 2023-05-15 NOTE — PROGRESS NOTES
[facility]  [address]  [address] [place] [address]  Dept: [phone]  Dept Fax: [phone]    Ivone Rodriguez is a 25 y.o. male who presents today for his medical conditions/complaintsas noted below. Ivone Rodriguez is c/o of   Chief Complaint   Patient presents with    Anxiety    Medication Check         HPI:     HPI    Patient is here for a recheck on anxiety. He feels the lexapro has been working well for him. He also finds that talking to others helps him decrease stress. He has also been working on his weight and better diet/exercise habits. He has been working out 3-4 times a week, 30 minutes of cardio each. He has also been trying to eat better. He is eating 3 meals a day and limiting snacks. He is still consuming carbs at most meals. Does pasta for dinner. He would like to see weight loss. No results found for: LABA1C          ( goal A1Cis < 7)   No results found for: LABMICR  No results found for: LDLCHOLESTEROL, LDLCALC    (goal LDL is <100)   No results found for: AST, ALT, BUN, CR  BP Readings from Last 3 Encounters:   05/15/23 134/70   02/06/23 128/70   01/13/23 124/70          (goal 120/80)    Past Medical History:   Diagnosis Date    ADHD (attention deficit hyperactivity disorder)     Depression February 2021      Past Surgical History:   Procedure Laterality Date    WISDOM TOOTH EXTRACTION         Family History   Problem Relation Age of Onset    No Known Problems Mother     No Known Problems Father     Stroke Maternal Grandmother     Stroke Maternal Grandfather     Heart Disease Paternal Grandmother         irregular heartbeat    Cancer Paternal Grandfather         lung, smoker       Social History     Tobacco Use    Smoking status: Never    Smokeless tobacco: Never   Substance Use Topics    Alcohol use:  Yes     Alcohol/week: 5.0 standard drinks     Types: 3 Shots of liquor, 2 Drinks containing 0.5 oz of alcohol per week

## 2023-05-15 NOTE — PROGRESS NOTES
Visit Information    Have you changed or started any medications since your last visit including any over-the-counter medicines, vitamins, or herbal medicines? no   Are you having any side effects from any of your medications? -  no  Have you stopped taking any of your medications? Is so, why? -  no    Have you seen any other physician or provider since your last visit? No  Have you had any other diagnostic tests since your last visit? No  Have you been seen in the emergency room and/or had an admission to a hospital since we last saw you? No  Have you had your routine dental cleaning in the past 6 months? no    Have you activated your [employer] account? If not, what are your barriers?  Yes     Patient Care Team:  Mauricio Stanford PA-C as PCP - General (Physician Assistant)  Mauricio Stanford PA-C as PCP - Empaneled Provider    Medical History Review  Past Medical, Family, and Social History reviewed and  contribute to the patient presenting condition    Health Maintenance   Topic Date Due    COVID-19 Vaccine (1) Never done    HIV screen  Never done    Varicella vaccine (2 of 2 - 2-dose childhood series) 02/16/2016    Hepatitis C screen  Never done    HPV vaccine (3 - Male 3-dose series) 04/28/2022    Flu vaccine (Season Ended) 08/01/2023    Depression Screen  02/06/2024    DTaP/Tdap/Td vaccine (5 - Td or Tdap) 12/28/2031    Hepatitis A vaccine  Completed    Hib vaccine  Completed    Meningococcal (ACWY) vaccine  Completed    Pneumococcal 0-64 years Vaccine  Aged Out

## 2023-06-06 ENCOUNTER — HOSPITAL ENCOUNTER (OUTPATIENT)
Dept: NUTRITION | Age: 23
Setting detail: THERAPIES SERIES
Discharge: HOME | End: 2023-06-06
Payer: COMMERCIAL

## 2023-06-06 VITALS — HEIGHT: 72 IN | BODY MASS INDEX: 37.43 KG/M2

## 2023-06-06 PROCEDURE — 97803 MED NUTRITION INDIV SUBSEQ: CPT

## 2023-06-06 NOTE — PROGRESS NOTES
Comprehensive Nutrition Assessment    Type and Reason for Visit:  Initial    Nutrition Recommendations/Plan:   Modify diet according to education. Continue daily physical activity. Monitor weight status. Malnutrition Assessment:  Malnutrition Status:  No malnutrition (06/06/23 1408)    Context:  Acute Illness     Findings of the 6 clinical characteristics of malnutrition:  Energy Intake:  No significant decrease in energy intake  Weight Loss:  No significant weight loss     Body Fat Loss:  No significant body fat loss     Muscle Mass Loss:  No significant muscle mass loss    Fluid Accumulation:  No significant fluid accumulation     Strength:  Not Performed    Nutrition Assessment:    Pt presented to the counseling session with concerns regarding undesired weight gain of 20-30# over the past 6 months. Pt stated he wanted to improve his eating habits and feel better with his weight. Pt is currently working full time second shift at [employer]. He mentioned he is currently getting adequate amount of sleep each night (approximately 8 hrs). In the morning 3x/week, pt is weight training in the gym. Eating patterns consist of a protein (1 scoop) smoothie or protein bar in the morning, frozen meals / turkey pretzel bun sandwich for lunch, and fried chicken strips for dinner. Snacks consumed in between meals include chips and pastries from the vending machine. Pt states he eats fast food only once/week. Pt mentioned he has limited knowledge with cooking and does not enjoy it. Pt states he drinks 3-4 16 oz water bottles/day with occasional soda consumption (apprixomately 2 cans/week). Nutrition education includes MyPlate and the importance of incorporating variety of food groups to increase nutrient density per meal. Lunch ideas were determined including preparing homemade frozen meals including a protein, instant rice, and frozen vegetables.  Other education includes protein rich snacks to pack for work along with

## 2023-08-17 ENCOUNTER — OFFICE VISIT (OUTPATIENT)
Dept: FAMILY MEDICINE CLINIC | Age: 23
End: 2023-08-17
Payer: COMMERCIAL

## 2023-08-17 VITALS
HEIGHT: 72 IN | HEART RATE: 60 BPM | OXYGEN SATURATION: 97 % | BODY MASS INDEX: 36.16 KG/M2 | WEIGHT: 267 LBS | TEMPERATURE: 97 F | SYSTOLIC BLOOD PRESSURE: 110 MMHG | DIASTOLIC BLOOD PRESSURE: 62 MMHG

## 2023-08-17 DIAGNOSIS — M25.561 CHRONIC PAIN OF BOTH KNEES: Primary | ICD-10-CM

## 2023-08-17 DIAGNOSIS — G89.29 CHRONIC PAIN OF BOTH KNEES: Primary | ICD-10-CM

## 2023-08-17 DIAGNOSIS — F41.9 ANXIETY: ICD-10-CM

## 2023-08-17 DIAGNOSIS — M25.562 CHRONIC PAIN OF BOTH KNEES: Primary | ICD-10-CM

## 2023-08-17 PROCEDURE — 1036F TOBACCO NON-USER: CPT | Performed by: PHYSICIAN ASSISTANT

## 2023-08-17 PROCEDURE — G8427 DOCREV CUR MEDS BY ELIG CLIN: HCPCS | Performed by: PHYSICIAN ASSISTANT

## 2023-08-17 PROCEDURE — 99213 OFFICE O/P EST LOW 20 MIN: CPT | Performed by: PHYSICIAN ASSISTANT

## 2023-08-17 PROCEDURE — G8417 CALC BMI ABV UP PARAM F/U: HCPCS | Performed by: PHYSICIAN ASSISTANT

## 2023-08-17 ASSESSMENT — ENCOUNTER SYMPTOMS
ABDOMINAL PAIN: 0
SHORTNESS OF BREATH: 0
COLOR CHANGE: 0
COUGH: 0

## 2023-08-17 NOTE — PROGRESS NOTES
[facility]  [address]  [address] [address]  Dept: [phone]  Dept Fax: [phone]    Erica Moncada is a 21 y.o. male who presents today for his medical conditions/complaintsas noted below. Erica Moncada is c/o of   Chief Complaint   Patient presents with    Knee Pain     Bilateral knee - he was doing leg presses about 1 years and felt something happen - didt feel right- right knee started now it is both    Depression     Medication check up-       HPI:     HPI    Patient is here for follow up on anxiety. He is presently on lexapro 20mg and states doing well on it. He reports no negative side effect noted. No neg thoughts. Good support at home. Recently moved back in with parents and has been working on weight loss. Mom helping him with better diet choices which he is happy with. He is down 8 pounds! Patient would like to continue the same dose. Patient is also reporting bilateral knee pain off and on. He states it is worse when he is physically active or golfing. Symptoms have been occurring since the spring. No specific trauma but he did feel irritation the first time after doing leg lifts in the spring. He has stopped doing those weight since.      No results found for: LABA1C          ( goal A1Cis < 7)   No components found for: LABMICR  No results found for: LDLCHOLESTEROL, LDLCALC    (goal LDL is <100)   No results found for: AST, ALT, BUN, CR  BP Readings from Last 3 Encounters:   08/17/23 110/62   05/15/23 134/70   02/06/23 128/70          (goal 120/80)    Past Medical History:   Diagnosis Date    ADHD (attention deficit hyperactivity disorder)     Depression February 2021      Past Surgical History:   Procedure Laterality Date    WISDOM TOOTH EXTRACTION         Family History   Problem Relation Age of Onset    No Known Problems Mother     No Known Problems Father     Stroke Maternal Grandmother     Stroke Maternal

## 2023-08-17 NOTE — PROGRESS NOTES
Visit Information    Have you changed or started any medications since your last visit including any over-the-counter medicines, vitamins, or herbal medicines? no   Are you having any side effects from any of your medications? -  no  Have you stopped taking any of your medications? Is so, why? -  no    Have you seen any other physician or provider since your last visit? No  Have you had any other diagnostic tests since your last visit? No  Have you been seen in the emergency room and/or had an admission to a hospital since we last saw you? No  Have you had your routine dental cleaning in the past 6 months? no    Have you activated your [employer] account? If not, what are your barriers?  Yes     Patient Care Team:  Champ Mccurdy PA-C as PCP - General (Physician Assistant)  Champ Mccurdy PA-C as PCP - Empaneled Provider    Medical History Review  Past Medical, Family, and Social History reviewed and  contribute to the patient presenting condition    Health Maintenance   Topic Date Due    COVID-19 Vaccine (1) Never done    HIV screen  Never done    Varicella vaccine (2 of 2 - 2-dose childhood series) 02/16/2016    Hepatitis C screen  Never done    Flu vaccine (1) 08/01/2023    Depression Screen  02/06/2024    DTaP/Tdap/Td vaccine (5 - Td or Tdap) 12/28/2031    Hepatitis A vaccine  Completed    Hib vaccine  Completed    HPV vaccine  Completed    Meningococcal (ACWY) vaccine  Completed    Pneumococcal 0-64 years Vaccine  Aged Out

## 2023-11-14 RX ORDER — ESCITALOPRAM OXALATE 20 MG/1
TABLET ORAL
Qty: 30 TABLET | Refills: 1 | Status: SHIPPED | OUTPATIENT
Start: 2023-11-14

## 2024-02-19 ENCOUNTER — OFFICE VISIT (OUTPATIENT)
Dept: FAMILY MEDICINE CLINIC | Age: 24
End: 2024-02-19
Payer: COMMERCIAL

## 2024-02-19 ENCOUNTER — HOSPITAL ENCOUNTER (OUTPATIENT)
Age: 24
Setting detail: SPECIMEN
Discharge: HOME | End: 2024-02-19

## 2024-02-19 VITALS
BODY MASS INDEX: 37.52 KG/M2 | TEMPERATURE: 97.6 F | WEIGHT: 277 LBS | DIASTOLIC BLOOD PRESSURE: 78 MMHG | HEIGHT: 72 IN | SYSTOLIC BLOOD PRESSURE: 128 MMHG | HEART RATE: 53 BPM | OXYGEN SATURATION: 98 %

## 2024-02-19 DIAGNOSIS — F41.9 ANXIETY: ICD-10-CM

## 2024-02-19 DIAGNOSIS — F41.9 ANXIETY: Primary | ICD-10-CM

## 2024-02-19 LAB
ALBUMIN SERPL-MCNC: 4.9 G/DL (ref 3.5–5.2)
ALBUMIN/GLOB SERPL: 2 {RATIO} (ref 1–2.5)
ALP SERPL-CCNC: 72 U/L (ref 40–129)
ALT SERPL-CCNC: 12 U/L (ref 10–50)
ANION GAP SERPL CALCULATED.3IONS-SCNC: 15 MMOL/L (ref 9–16)
AST SERPL-CCNC: 28 U/L (ref 10–50)
BASOPHILS # BLD: <0.03 K/UL (ref 0–0.2)
BASOPHILS NFR BLD: 0 % (ref 0–2)
BILIRUB SERPL-MCNC: 0.3 MG/DL (ref 0–1.2)
BUN SERPL-MCNC: 13 MG/DL (ref 6–20)
CALCIUM SERPL-MCNC: 9.4 MG/DL (ref 8.6–10.4)
CHLORIDE SERPL-SCNC: 102 MMOL/L (ref 98–107)
CO2 SERPL-SCNC: 20 MMOL/L (ref 20–31)
CREAT SERPL-MCNC: 1.1 MG/DL (ref 0.7–1.2)
EOSINOPHIL # BLD: 0.08 K/UL (ref 0–0.44)
EOSINOPHILS RELATIVE PERCENT: 1 % (ref 1–4)
ERYTHROCYTE [DISTWIDTH] IN BLOOD BY AUTOMATED COUNT: 12.8 % (ref 11.8–14.4)
GFR SERPL CREATININE-BSD FRML MDRD: >60 ML/MIN/1.73M2
GLUCOSE SERPL-MCNC: 81 MG/DL (ref 74–99)
HCT VFR BLD AUTO: 44.4 % (ref 40.7–50.3)
HGB BLD-MCNC: 14.8 G/DL (ref 13–17)
IMM GRANULOCYTES # BLD AUTO: <0.03 K/UL (ref 0–0.3)
IMM GRANULOCYTES NFR BLD: 0 %
LYMPHOCYTES NFR BLD: 2.66 K/UL (ref 1.1–3.7)
LYMPHOCYTES RELATIVE PERCENT: 35 % (ref 24–43)
MCH RBC QN AUTO: 29.7 PG (ref 25.2–33.5)
MCHC RBC AUTO-ENTMCNC: 33.3 G/DL (ref 28.4–34.8)
MCV RBC AUTO: 89.2 FL (ref 82.6–102.9)
MONOCYTES NFR BLD: 0.43 K/UL (ref 0.1–1.2)
MONOCYTES NFR BLD: 6 % (ref 3–12)
NEUTROPHILS NFR BLD: 58 % (ref 36–65)
NEUTS SEG NFR BLD: 4.31 K/UL (ref 1.5–8.1)
NRBC BLD-RTO: 0 PER 100 WBC
PLATELET # BLD AUTO: 271 K/UL (ref 138–453)
PMV BLD AUTO: 10.2 FL (ref 8.1–13.5)
POTASSIUM SERPL-SCNC: 4.6 MMOL/L (ref 3.7–5.3)
PROT SERPL-MCNC: 7.8 G/DL (ref 6.6–8.7)
RBC # BLD AUTO: 4.98 M/UL (ref 4.21–5.77)
SODIUM SERPL-SCNC: 137 MMOL/L (ref 136–145)
WBC OTHER # BLD: 7.5 K/UL (ref 3.5–11.3)

## 2024-02-19 PROCEDURE — G8417 CALC BMI ABV UP PARAM F/U: HCPCS | Performed by: PHYSICIAN ASSISTANT

## 2024-02-19 PROCEDURE — 1036F TOBACCO NON-USER: CPT | Performed by: PHYSICIAN ASSISTANT

## 2024-02-19 PROCEDURE — G8427 DOCREV CUR MEDS BY ELIG CLIN: HCPCS | Performed by: PHYSICIAN ASSISTANT

## 2024-02-19 PROCEDURE — G8484 FLU IMMUNIZE NO ADMIN: HCPCS | Performed by: PHYSICIAN ASSISTANT

## 2024-02-19 PROCEDURE — 99213 OFFICE O/P EST LOW 20 MIN: CPT | Performed by: PHYSICIAN ASSISTANT

## 2024-02-19 RX ORDER — ESCITALOPRAM OXALATE 20 MG/1
20 TABLET ORAL DAILY
Qty: 90 TABLET | Refills: 1 | Status: SHIPPED | OUTPATIENT
Start: 2024-02-19 | End: 2024-08-17

## 2024-02-19 SDOH — ECONOMIC STABILITY: INCOME INSECURITY: HOW HARD IS IT FOR YOU TO PAY FOR THE VERY BASICS LIKE FOOD, HOUSING, MEDICAL CARE, AND HEATING?: NOT HARD AT ALL

## 2024-02-19 SDOH — ECONOMIC STABILITY: FOOD INSECURITY: WITHIN THE PAST 12 MONTHS, THE FOOD YOU BOUGHT JUST DIDN'T LAST AND YOU DIDN'T HAVE MONEY TO GET MORE.: NEVER TRUE

## 2024-02-19 SDOH — ECONOMIC STABILITY: FOOD INSECURITY: WITHIN THE PAST 12 MONTHS, YOU WORRIED THAT YOUR FOOD WOULD RUN OUT BEFORE YOU GOT MONEY TO BUY MORE.: NEVER TRUE

## 2024-02-19 ASSESSMENT — PATIENT HEALTH QUESTIONNAIRE - PHQ9
1. LITTLE INTEREST OR PLEASURE IN DOING THINGS: 0
SUM OF ALL RESPONSES TO PHQ QUESTIONS 1-9: 0
SUM OF ALL RESPONSES TO PHQ9 QUESTIONS 1 & 2: 0
SUM OF ALL RESPONSES TO PHQ QUESTIONS 1-9: 0
2. FEELING DOWN, DEPRESSED OR HOPELESS: 0

## 2024-02-19 ASSESSMENT — ENCOUNTER SYMPTOMS
WHEEZING: 0
ABDOMINAL PAIN: 0
SHORTNESS OF BREATH: 0
COLOR CHANGE: 0
COUGH: 0

## 2024-02-19 NOTE — PROGRESS NOTES
[facility]  [facility]  [address] [facility] [address]  Dept: [phone]  Dept Fax: [phone]    Khris Christianson is a 23 y.o. male who presents today for his medical conditions/complaintsas noted below.  Khris Christianson is c/o of   Chief Complaint   Patient presents with    Medication Check    Anxiety       HPI:     HPI    Patient is here today for follow up on anxiety treatment.  Patient is presently on Lexapro 20 mg once daily.  Patient states he has been tolerating the medication well. Patient states he is working a new job and enjoys that. He feels like the medication helps him feel more \"even\". He is needing a refill at this time.  He denies any present side effects.  He is feeling a consistent routine with his new job which he finds good and also a little boring.  He is considering changing up his routine a little bit.  He is not presently speaking with a counselor.  He does have good family and friends support    No results found for: \"LABA1C\"          ( goal A1Cis < 7)   No components found for: \"LABMICR\"  No results found for: \"LDLCHOLESTEROL\", \"LDLCALC\"    (goal LDL is <100)   No results found for: \"AST\", \"ALT\", \"BUN\", \"CR\"  BP Readings from Last 3 Encounters:   02/19/24 128/78   08/17/23 110/62   05/15/23 134/70          (goal 120/80)    Past Medical History:   Diagnosis Date    ADHD (attention deficit hyperactivity disorder)     Anxiety 8/17/2023    Depression February 2021      Past Surgical History:   Procedure Laterality Date    WISDOM TOOTH EXTRACTION         Family History   Problem Relation Age of Onset    No Known Problems Mother     No Known Problems Father     Stroke Maternal Grandmother     Stroke Maternal Grandfather     Heart Disease Paternal Grandmother         irregular heartbeat    Cancer Paternal Grandfather         lung, smoker       Social History     Tobacco Use    Smoking status: Never    Smokeless tobacco: Never

## 2024-02-19 NOTE — PROGRESS NOTES
Visit Information    Have you changed or started any medications since your last visit including any over-the-counter medicines, vitamins, or herbal medicines? no   Are you having any side effects from any of your medications? -  no  Have you stopped taking any of your medications? Is so, why? -  no    Have you seen any other physician or provider since your last visit? No  Have you had any other diagnostic tests since your last visit? No  Have you been seen in the emergency room and/or had an admission to a hospital since we last saw you? No  Have you had your routine dental cleaning in the past 6 months? no    Have you activated your [employer] account? If not, what are your barriers? Yes     Patient Care Team:  Saadia Hernandez PA-C as PCP - General (Physician Assistant)  Saadia Hernadnez PA-C as PCP - Empaneled Provider    Medical History Review  Past Medical, Family, and Social History reviewed and  contribute to the patient presenting condition    Health Maintenance   Topic Date Due    Hepatitis B vaccine (1 of 3 - 3-dose series) Never done    COVID-19 Vaccine (1) Never done    Polio vaccine (2 of 3 - 4-dose series) 08/18/2004    HIV screen  Never done    Varicella vaccine (2 of 2 - 2-dose childhood series) 02/16/2016    Hepatitis C screen  Never done    Flu vaccine (1) 08/01/2023    Depression Screen  02/06/2024    DTaP/Tdap/Td vaccine (5 - Td or Tdap) 12/28/2031    Hepatitis A vaccine  Completed    Hib vaccine  Completed    HPV vaccine  Completed    Meningococcal (ACWY) vaccine  Completed    Pneumococcal 0-64 years Vaccine  Aged Out

## 2024-02-20 LAB — TSH SERPL DL<=0.05 MIU/L-ACNC: 1.99 UIU/ML (ref 0.3–5)

## 2024-05-22 RX ORDER — ESCITALOPRAM OXALATE 20 MG/1
20 TABLET ORAL DAILY
Qty: 90 TABLET | Refills: 1 | Status: SHIPPED | OUTPATIENT
Start: 2024-05-22

## 2024-08-23 RX ORDER — ESCITALOPRAM OXALATE 20 MG/1
20 TABLET ORAL DAILY
Qty: 90 TABLET | Refills: 1 | Status: SHIPPED | OUTPATIENT
Start: 2024-08-23

## 2024-09-18 ENCOUNTER — OFFICE VISIT (OUTPATIENT)
Dept: PRIMARY CARE CLINIC | Age: 24
End: 2024-09-18
Payer: COMMERCIAL

## 2024-09-18 VITALS
TEMPERATURE: 98.3 F | HEIGHT: 73 IN | BODY MASS INDEX: 36.71 KG/M2 | SYSTOLIC BLOOD PRESSURE: 125 MMHG | DIASTOLIC BLOOD PRESSURE: 87 MMHG | OXYGEN SATURATION: 96 % | WEIGHT: 277 LBS | HEART RATE: 71 BPM

## 2024-09-18 DIAGNOSIS — H65.93 OTITIS MEDIA WITH EFFUSION, BILATERAL: Primary | ICD-10-CM

## 2024-09-18 PROCEDURE — 99213 OFFICE O/P EST LOW 20 MIN: CPT

## 2024-09-18 RX ORDER — MECLIZINE HYDROCHLORIDE 25 MG/1
25 TABLET ORAL 3 TIMES DAILY PRN
Qty: 15 TABLET | Refills: 0 | OUTPATIENT
Start: 2024-09-18 | End: 2024-09-25

## 2024-09-18 ASSESSMENT — ENCOUNTER SYMPTOMS
CHOKING: 0
BLOOD IN STOOL: 0
CONSTIPATION: 0
VISUAL CHANGE: 0
COLOR CHANGE: 0
SORE THROAT: 0
SHORTNESS OF BREATH: 0
ABDOMINAL PAIN: 0
COUGH: 0
PHOTOPHOBIA: 0
NAUSEA: 1
RHINORRHEA: 0
BACK PAIN: 0
RESPIRATORY NEGATIVE: 1
TROUBLE SWALLOWING: 0
EYE ITCHING: 0
STRIDOR: 0
EYES NEGATIVE: 1
RECTAL PAIN: 0
EYE REDNESS: 0
APNEA: 0
CHANGE IN BOWEL HABIT: 0
VOICE CHANGE: 0
VOMITING: 0
SINUS PAIN: 0
FACIAL SWELLING: 0
EYE DISCHARGE: 0
WHEEZING: 0
CHEST TIGHTNESS: 0
ABDOMINAL DISTENTION: 0
SWOLLEN GLANDS: 0
SINUS PRESSURE: 0
DIARRHEA: 0
EYE PAIN: 0

## 2024-10-25 ENCOUNTER — OFFICE VISIT (OUTPATIENT)
Dept: PRIMARY CARE CLINIC | Age: 24
End: 2024-10-25

## 2024-10-25 VITALS
OXYGEN SATURATION: 94 % | HEART RATE: 80 BPM | TEMPERATURE: 98.3 F | SYSTOLIC BLOOD PRESSURE: 122 MMHG | DIASTOLIC BLOOD PRESSURE: 83 MMHG

## 2024-10-25 DIAGNOSIS — J02.9 SORE THROAT: ICD-10-CM

## 2024-10-25 DIAGNOSIS — J02.0 ACUTE STREPTOCOCCAL PHARYNGITIS: Primary | ICD-10-CM

## 2024-10-25 LAB — S PYO AG THROAT QL: POSITIVE

## 2024-10-25 RX ORDER — AZITHROMYCIN 250 MG/1
TABLET, FILM COATED ORAL
Qty: 1 PACKET | Refills: 0 | Status: SHIPPED | OUTPATIENT
Start: 2024-10-25

## 2024-10-25 ASSESSMENT — ENCOUNTER SYMPTOMS
VOICE CHANGE: 0
SORE THROAT: 1
COUGH: 1
EYE DISCHARGE: 0
SINUS PRESSURE: 0
WHEEZING: 0
EYE REDNESS: 0
CHEST TIGHTNESS: 0
SHORTNESS OF BREATH: 0

## 2024-10-25 NOTE — PROGRESS NOTES
[facility]YS[facility]ANS [facility] IN CARE  [address]  [facility] [address]  Dept: [phone]  Dept Fax: [phone]     Khris Christianson is a 24 y.o. male who presents to the urgent care today for his medicalconditions/complaints as noted below.  Khris Christianson is c/o of Pharyngitis (Sore throat, runny nose, cough x 2 days )    HPI:      Pharyngitis  This is a new problem. Episode onset: 2 days ago. The problem has been unchanged. Associated symptoms include chills, congestion, coughing, diaphoresis, a fever (felt feverish), headaches and a sore throat. Pertinent negatives include no chest pain, fatigue, myalgias, rash or weakness. The symptoms are aggravated by drinking, eating and swallowing. Treatments tried: otc tx. The treatment provided no relief.     Past Medical History:   Diagnosis Date    ADHD (attention deficit hyperactivity disorder)     Anxiety 8/17/2023    Depression February 2021       Current Outpatient Medications   Medication Sig Dispense Refill    azithromycin (ZITHROMAX Z-LUCY) 250 MG tablet Take 2 tabs on day 1 followed by 1 tab on days 2-5. 1 packet 0    escitalopram (LEXAPRO) 20 MG tablet TAKE 1 TABLET BY MOUTH DAILY 90 tablet 1     No current facility-administered medications for this visit.     No Known Allergies    Subjective:      Review of Systems   Constitutional:  Positive for chills, diaphoresis and fever (felt feverish). Negative for fatigue.   HENT:  Positive for congestion, postnasal drip and sore throat. Negative for ear discharge, ear pain, sinus pressure, sneezing and voice change.    Eyes:  Negative for discharge and redness.   Respiratory:  Positive for cough. Negative for chest tightness, shortness of breath and wheezing.    Cardiovascular: Negative.  Negative for chest pain.   Musculoskeletal:  Negative for myalgias.   Skin:  Negative for rash.   Neurological:  Positive for headaches. Negative for dizziness, weakness

## 2024-11-19 ENCOUNTER — OFFICE VISIT (OUTPATIENT)
Dept: FAMILY MEDICINE CLINIC | Age: 24
End: 2024-11-19
Payer: COMMERCIAL

## 2024-11-19 VITALS
WEIGHT: 272 LBS | DIASTOLIC BLOOD PRESSURE: 78 MMHG | TEMPERATURE: 98.3 F | HEIGHT: 73 IN | SYSTOLIC BLOOD PRESSURE: 124 MMHG | HEART RATE: 75 BPM | BODY MASS INDEX: 36.05 KG/M2 | OXYGEN SATURATION: 97 %

## 2024-11-19 DIAGNOSIS — R05.2 SUBACUTE COUGH: ICD-10-CM

## 2024-11-19 DIAGNOSIS — F41.9 ANXIETY: ICD-10-CM

## 2024-11-19 DIAGNOSIS — Z00.00 ENCOUNTER FOR WELL ADULT EXAM WITHOUT ABNORMAL FINDINGS: Primary | ICD-10-CM

## 2024-11-19 PROCEDURE — 99395 PREV VISIT EST AGE 18-39: CPT | Performed by: PHYSICIAN ASSISTANT

## 2024-11-19 ASSESSMENT — ENCOUNTER SYMPTOMS
ABDOMINAL PAIN: 0
CHEST TIGHTNESS: 0
EYE DISCHARGE: 0
RHINORRHEA: 0
WHEEZING: 0
CONSTIPATION: 0
SORE THROAT: 0
COUGH: 0
TROUBLE SWALLOWING: 0
SINUS PRESSURE: 0
EYE REDNESS: 0
BLOOD IN STOOL: 0
VOICE CHANGE: 0
DIARRHEA: 0
BACK PAIN: 0
SHORTNESS OF BREATH: 0
COLOR CHANGE: 0
SINUS PAIN: 0
VOMITING: 0
NAUSEA: 0

## 2024-11-19 NOTE — PROGRESS NOTES
Visit Information    Have you changed or started any medications since your last visit including any over-the-counter medicines, vitamins, or herbal medicines? no   Are you having any side effects from any of your medications? -  no  Have you stopped taking any of your medications? Is so, why? -  no    Have you seen any other physician or provider since your last visit? No  Have you had any other diagnostic tests since your last visit? No  Have you been seen in the emergency room and/or had an admission to a hospital since we last saw you? No  Have you had your routine dental cleaning in the past 6 months? no    Have you activated your [employer] account? If not, what are your barriers? Yes     Patient Care Team:  Saadia Hernandez PA-C as PCP - General (Physician Assistant)  Saadia Hernandez PA-C as PCP - Empaneled Provider    Medical History Review  Past Medical, Family, and Social History reviewed and  contribute to the patient presenting condition    Health Maintenance   Topic Date Due    Polio vaccine (2 of 3 - 4-dose series) 08/18/2004    HIV screen  Never done    Varicella vaccine (2 of 2 - 2-dose childhood series) 02/16/2016    Hepatitis C screen  Never done    Hepatitis B vaccine (1 of 3 - 19+ 3-dose series) Never done    Flu vaccine (1) 08/01/2024    COVID-19 Vaccine (1 - 2023-24 season) Never done    Depression Screen  02/19/2025    DTaP/Tdap/Td vaccine (5 - Td or Tdap) 12/28/2031    Hepatitis A vaccine  Completed    Hib vaccine  Completed    HPV vaccine  Completed    Meningococcal (ACWY) vaccine  Completed    Pneumococcal 0-64 years Vaccine  Aged Out

## 2024-11-19 NOTE — PROGRESS NOTES
[facility]  [facility]  [address] [facility] [address]  Dept: [phone]  Dept Fax: [phone]    Khris Christianson is a 24 y.o. male who presents today for his medical conditions/complaintsas noted below.  Khris Christianson is c/o of   Chief Complaint   Patient presents with    Annual Exam    Cough     Had strep a couple weeks ago - has developed a cough  Finished antibiotic    Sinus Problem     congestion         HPI:     HPI    Patient is here today for a well exam. Patient states overall he has been feeling well other than recent illness. He continues to try and eat healthy most days. He does door dash off and on. He does try to get to the gym to work out at least 4 days a week. He does some cardio and lifting.   He does follow with a dentist regularly. Not present following with eye doctor, reports vision is stable  Patient reports he continues to feel well on lexapro. He would like to continue the present dose. He does note that his concentration is not that great. He was on focalin for ADD as a child. Would like to see his concentration improve    He is also here reporting feeling ill recently. He was diagnosed with strep throat at the urgent care and was given a zpack. Patient states he has completed it now. He is feeling better in his throat but still has a bad cough. Patient denies any fevers. Patient reports eating and drinking normally. Has not been able to work out since feeling poorly. OTC cough suppressant helping.    No results found for: \"LABA1C\"          ( goal A1Cis < 7)   No components found for: \"LABMICR\"  No components found for: \"LDLCHOLESTEROL\", \"LDLCALC\"    (goal LDL is <100)   AST (U/L)   Date Value   02/19/2024 28     ALT (U/L)   Date Value   02/19/2024 12     BUN (mg/dL)   Date Value   02/19/2024 13     BP Readings from Last 3 Encounters:   11/19/24 124/78   10/25/24 122/83   09/18/24 125/87          (goal 120/80)    Past Medical History:

## 2025-04-03 ENCOUNTER — OFFICE VISIT (OUTPATIENT)
Dept: PRIMARY CARE CLINIC | Age: 25
End: 2025-04-03
Payer: COMMERCIAL

## 2025-04-03 VITALS
HEART RATE: 56 BPM | OXYGEN SATURATION: 96 % | SYSTOLIC BLOOD PRESSURE: 118 MMHG | DIASTOLIC BLOOD PRESSURE: 73 MMHG | TEMPERATURE: 96.8 F

## 2025-04-03 DIAGNOSIS — J01.90 ACUTE BACTERIAL SINUSITIS: Primary | ICD-10-CM

## 2025-04-03 DIAGNOSIS — B96.89 ACUTE BACTERIAL SINUSITIS: Primary | ICD-10-CM

## 2025-04-03 PROCEDURE — 99213 OFFICE O/P EST LOW 20 MIN: CPT

## 2025-04-03 RX ORDER — FLUTICASONE PROPIONATE 50 MCG
2 SPRAY, SUSPENSION (ML) NASAL DAILY
Qty: 16 G | Refills: 0 | Status: SHIPPED | OUTPATIENT
Start: 2025-04-03

## 2025-04-03 ASSESSMENT — ENCOUNTER SYMPTOMS
EYES NEGATIVE: 1
ABDOMINAL DISTENTION: 0
GASTROINTESTINAL NEGATIVE: 1
COUGH: 1
EYE ITCHING: 0
RHINORRHEA: 0
RECTAL PAIN: 0
TROUBLE SWALLOWING: 0
CHOKING: 0
VOICE CHANGE: 0
BACK PAIN: 0
SINUS PAIN: 0
EYE DISCHARGE: 0
ABDOMINAL PAIN: 0
BLOOD IN STOOL: 0
PHOTOPHOBIA: 0
DIARRHEA: 0
HOARSE VOICE: 0
SORE THROAT: 0
VOMITING: 0
EYE PAIN: 0
NAUSEA: 0
SWOLLEN GLANDS: 0
COLOR CHANGE: 0
APNEA: 0
SINUS COMPLAINT: 1
FACIAL SWELLING: 0
STRIDOR: 0
SHORTNESS OF BREATH: 0
WHEEZING: 0
SINUS PRESSURE: 1
CONSTIPATION: 0
CHEST TIGHTNESS: 0
ANAL BLEEDING: 0
EYE REDNESS: 0

## 2025-04-03 NOTE — PROGRESS NOTES
[facility] WALK IN CARE  [address]  [place] [address]    [facility] WALK IN CARE  [address]  [facility] [address]  Dept: [phone]     Khris Christianson is a 24 y.o. male Established patient, who presents to the walk-in clinic today with conditions/complaints as noted below:    Chief Complaint   Patient presents with    Nasal Congestion     Productive cough, sneezing, sinus pressure         HPI:     Sinus Problem  This is a new problem. Episode onset: 2 weeks ago. The problem is unchanged. There has been no fever. He is experiencing no pain. Associated symptoms include congestion, coughing, sinus pressure and sneezing. Pertinent negatives include no chills, diaphoresis, ear pain, headaches, hoarse voice, neck pain, shortness of breath, sore throat or swollen glands. Treatments tried: dayquil, zyrtec, tan selwichoter. The treatment provided mild relief.       Past Medical History:   Diagnosis Date    ADHD (attention deficit hyperactivity disorder)     Anxiety 8/17/2023    Depression February 2021       Current Outpatient Medications   Medication Sig Dispense Refill    amoxicillin-clavulanate (AUGMENTIN) 875-125 MG per tablet Take 1 tablet by mouth 2 times daily for 10 days 20 tablet 0    fluticasone (FLONASE) 50 MCG/ACT nasal spray 2 sprays by Each Nostril route daily 16 g 0    escitalopram (LEXAPRO) 20 MG tablet TAKE 1 TABLET BY MOUTH DAILY 90 tablet 1     No current facility-administered medications for this visit.       No Known Allergies    Review of Systems:     Review of Systems   Constitutional:  Positive for fatigue. Negative for activity change, appetite change, chills, diaphoresis, fever and unexpected weight change.   HENT:  Positive for congestion, sinus pressure and sneezing. Negative for dental problem, drooling, ear discharge, ear pain, facial swelling,

## 2025-05-07 RX ORDER — ESCITALOPRAM OXALATE 20 MG/1
20 TABLET ORAL DAILY
Qty: 90 TABLET | Refills: 0 | Status: SHIPPED | OUTPATIENT
Start: 2025-05-07

## 2025-08-15 RX ORDER — ESCITALOPRAM OXALATE 20 MG/1
20 TABLET ORAL DAILY
Qty: 90 TABLET | Refills: 0 | Status: SHIPPED | OUTPATIENT
Start: 2025-08-15